# Patient Record
Sex: FEMALE | Race: WHITE | Employment: UNEMPLOYED | URBAN - METROPOLITAN AREA
[De-identification: names, ages, dates, MRNs, and addresses within clinical notes are randomized per-mention and may not be internally consistent; named-entity substitution may affect disease eponyms.]

---

## 2018-08-29 ENCOUNTER — LAB ENCOUNTER (OUTPATIENT)
Dept: LAB | Age: 38
End: 2018-08-29
Attending: NURSE PRACTITIONER
Payer: COMMERCIAL

## 2018-08-29 DIAGNOSIS — Z01.419 PAP SMEAR, LOW-RISK: ICD-10-CM

## 2018-08-29 PROCEDURE — 88175 CYTOPATH C/V AUTO FLUID REDO: CPT

## 2018-08-29 PROCEDURE — 87624 HPV HI-RISK TYP POOLED RSLT: CPT

## 2018-08-30 LAB — HPV I/H RISK 1 DNA SPEC QL NAA+PROBE: NEGATIVE

## 2018-08-31 LAB — LAST PAP RESULT: NORMAL

## 2024-10-25 ENCOUNTER — OFFICE VISIT (OUTPATIENT)
Dept: OBGYN CLINIC | Facility: CLINIC | Age: 44
End: 2024-10-25
Payer: COMMERCIAL

## 2024-10-25 VITALS — HEART RATE: 89 BPM | DIASTOLIC BLOOD PRESSURE: 72 MMHG | SYSTOLIC BLOOD PRESSURE: 124 MMHG | WEIGHT: 152.81 LBS

## 2024-10-25 DIAGNOSIS — Z12.31 ENCOUNTER FOR SCREENING MAMMOGRAM FOR MALIGNANT NEOPLASM OF BREAST: ICD-10-CM

## 2024-10-25 DIAGNOSIS — N64.4 BREAST PAIN, LEFT: Primary | ICD-10-CM

## 2024-10-25 DIAGNOSIS — Z12.4 CERVICAL CANCER SCREENING: ICD-10-CM

## 2024-10-25 PROCEDURE — 87624 HPV HI-RISK TYP POOLED RSLT: CPT | Performed by: OBSTETRICS & GYNECOLOGY

## 2024-10-25 PROCEDURE — 88175 CYTOPATH C/V AUTO FLUID REDO: CPT | Performed by: OBSTETRICS & GYNECOLOGY

## 2024-10-25 PROCEDURE — 99203 OFFICE O/P NEW LOW 30 MIN: CPT | Performed by: OBSTETRICS & GYNECOLOGY

## 2024-10-25 NOTE — PROGRESS NOTES
[facility]  Obstetrics and Gynecology  GYN Visit    Subjective:     Sushila Craft is a 44 year old  female who presents for left breast pain. The patient reports that this pain started a few weeks ago; says it is occur on the lateral side of her left breast. It feels like aching pain, sometimes notices it more when she's lying on that side at night. Not related to menses. Not taking any new medications, no new trauma. Has been lifting heavier recently. No fevers. Right side sometimes also has pain in the similar area but not as much as left side.     Has not seen a GYN in a while.   No new medical problems.   Regular periods.  Has three children. 18, 16, 12. Oldest is at [facility].         OB History    Para Term  AB Living   3 3 3     3   SAB IAB Ectopic Multiple Live Births           3      # Outcome Date GA Lbr Flakito/2nd Weight Sex Type Anes PTL Lv   3 Term      NORMAL SPONT   SHANIKA   2 Term      NORMAL SPONT   SHANIKA   1 Term      NORMAL SPONT   SHANIKA       Gyn History:  Menarche: 13  Period Cycle (Days): 28-30 days  Period Duration (Days): 5 days  Period Flow: Moderate  Use of Birth Control (if yes, specify type): Vasectomy  Hx Prior Abnormal Pap: No  Pap Date: 18  Pap Result Notes: [facility]  Patient's last menstrual period was 10/01/2024 (exact date).      Meds:  Medications Ordered Prior to Encounter[1]    All:  Allergies[2]    PMH:  Past Medical History:    Acute sinusitis    Normal delivery (HCC)    Undiagnosed cardiac murmurs       PSH:  Past Surgical History:   Procedure Laterality Date    Cholecystectomy      Colposcopy, cervix w/upper adjacent vagina; w/biopsy(s), cervix      pap=ASCUS, ECC= scant fragments of endometrium with abundant mucin and acute inflammation         Objective:     Vitals:    10/25/24 0740   BP: 124/72   Pulse: 89   Weight: 152 lb 12.8 oz (69.3 kg)     There is no height or weight on file to calculate BMI.    General: AAO.NAD.   CVS exam: normal  peripheral perfusion  Chest: non-labored breathing, no tachypnea   Breast: symmetric, no dominant or suspicious mass, no skin or nipple changes and no axillary adenopathy; pain elicited on deeper palpation of lateral left breast; appears to be on chest wall.  Abdominal exam: soft, nontender, nondistended  Pelvic exam:   VULVA: normal appearing vulva with no masses, tenderness or lesions  PERINEUM:  normal appearing, no lesions   URETHRAL MEATUS: normal appearing, no lesions   VAGINA: normal appearing vagina with normal color and discharge, no lesions  CERVIX: normal appearing cervix without discharge or lesions  UTERUS: uterus is normal size, shape, consistency and nontender  ADNEXA: normal adnexa in size, nontender and no masses  PERIRECTAL: normal appearing, no lesions   Ext: non-tender, no edema    Labs:  N/a    Imaging:  N/a       Assessment/Plan:     Sushila Craft is a 44 year old  female who is here for a GYN visit for left breast pain    Problem List Items Addressed This Visit    None  Visit Diagnoses       Breast pain, left    -  Primary    Relevant Orders    [facility] JESSI 2D+3D SCREENING BILAT (CPT=77067/90403)    [facility] DIAGNOSTIC LEFT (CPT=77065)    Cervical cancer screening        Relevant Orders    ThinPrep PAP Smear    Hpv Dna  High Risk , Thin Prep Collect    Encounter for screening mammogram for malignant neoplasm of breast        Relevant Orders    [facility] JESSI 2D+3D SCREENING BILAT (CPT=77067/34780)            Left breast pain  - Started a few weeks ago, lateral left breast  - On exam; recreated with deep palpation of chest wall; no concerning masses palpated  - Likely pain is chest wall radiating to breasts; pt is due for screening mammogram; will order with diagnostic mammogram for left breast    GYN Health Maintenance  - Periods regular  - Last mammogram never; will order  - Last Pap NILM, neg HPV in 2018, repeat collected TODAY       All of the findings and plan were discussed with the patient.   She notes understanding and agrees with the plan of care.  All questions were answered to the best of my ability at this time.      Total patient time was 20 minutes in evaluation, consultation, and coordination of care. This included face to face and non-face to face actions. The patient's questions and concerns were addressed.       RTC annually or sooner if needed     Yessi Eagle MD   [facility] - OBGYN       Discussed with patient that there will not be further notification of normal or benign results other than receiving results on [employer]. A [employer] message or telephone call will be placed by the physician and/or office staff if results are abnormal.     Note to patient and family   The 21st Century Cures Act makes medical notes available to patients in the interest of transparency.  However, please be advised that this is a medical document.  It is intended as [id number] communication.  It is written and medical language may contain abbreviations or verbiage that are technical and unfamiliar.  It may appear blunt or direct.  Medical documents are intended to carry relevant information, facts as evident, and the clinical opinion of the practitioner.        This note could include assistance by Dragon voice recognition. Errors in content may be related to improper recognition by the system; efforts to review and correct have been done but errors may still exist.          [1]   Current Outpatient Medications on File Prior to Visit   Medication Sig Dispense Refill    Montelukast Sodium 10 MG Oral Tab Take 1 tablet (10 mg total) by mouth nightly. 30 tablet 3    SERTRALINE  MG Oral Tab TAKE 1 TABLET BY MOUTH EVERY DAY (Patient not taking: Reported on 10/25/2024) 30 tablet 0    Diclofenac Sodium (VOLTAREN) 75 MG Oral Tab EC Take 1 tablet by mouth 2 (two) times daily. (Patient not taking: Reported on 10/25/2024) 60 tablet 1    MOTRIN IB OR AS NEEDED  (Patient not taking: Reported on 10/25/2024)       No current  facility-administered medications on file prior to visit.   [2]   Allergies  Allergen Reactions    Codeine PALPITATIONS     \"derivatives\"    Erythromycin RASH     \"TABS\"    Erythromycin Base RASH    Opioid Analgesics PALPITATIONS

## 2024-10-28 LAB — HPV E6+E7 MRNA CVX QL NAA+PROBE: NEGATIVE

## 2024-10-31 LAB
.: NORMAL
.: NORMAL

## 2024-11-02 ENCOUNTER — HOSPITAL ENCOUNTER (OUTPATIENT)
Dept: MAMMOGRAPHY | Age: 44
Discharge: HOME | End: 2024-11-02
Attending: OBSTETRICS & GYNECOLOGY
Payer: COMMERCIAL

## 2024-11-02 DIAGNOSIS — N64.4 BREAST PAIN, LEFT: ICD-10-CM

## 2024-11-02 DIAGNOSIS — Z12.31 ENCOUNTER FOR SCREENING MAMMOGRAM FOR MALIGNANT NEOPLASM OF BREAST: ICD-10-CM

## 2024-11-02 PROCEDURE — 77067 SCR MAMMO BI INCL CAD: CPT | Performed by: OBSTETRICS & GYNECOLOGY

## 2024-11-02 PROCEDURE — 77063 BREAST TOMOSYNTHESIS BI: CPT | Performed by: OBSTETRICS & GYNECOLOGY

## 2024-12-06 ENCOUNTER — HOSPITAL ENCOUNTER (OUTPATIENT)
Dept: MAMMOGRAPHY | Age: 44
Discharge: HOME | End: 2024-12-06
Attending: OBSTETRICS & GYNECOLOGY
Payer: COMMERCIAL

## 2024-12-06 ENCOUNTER — HOSPITAL ENCOUNTER (OUTPATIENT)
Dept: ULTRASOUND IMAGING | Age: 44
Discharge: HOME | End: 2024-12-06
Attending: OBSTETRICS & GYNECOLOGY
Payer: COMMERCIAL

## 2024-12-06 DIAGNOSIS — R92.2 INCONCLUSIVE MAMMOGRAM: ICD-10-CM

## 2024-12-06 PROCEDURE — 76642 ULTRASOUND BREAST LIMITED: CPT | Performed by: OBSTETRICS & GYNECOLOGY

## 2024-12-06 PROCEDURE — 77061 BREAST TOMOSYNTHESIS UNI: CPT | Performed by: OBSTETRICS & GYNECOLOGY

## 2024-12-06 PROCEDURE — 77065 DX MAMMO INCL CAD UNI: CPT | Performed by: OBSTETRICS & GYNECOLOGY
